# Patient Record
Sex: MALE | Race: WHITE | Employment: OTHER | URBAN - METROPOLITAN AREA
[De-identification: names, ages, dates, MRNs, and addresses within clinical notes are randomized per-mention and may not be internally consistent; named-entity substitution may affect disease eponyms.]

---

## 2023-12-30 ENCOUNTER — APPOINTMENT (OUTPATIENT)
Dept: CT IMAGING | Age: 73
End: 2023-12-30
Payer: MEDICARE

## 2023-12-30 ENCOUNTER — HOSPITAL ENCOUNTER (EMERGENCY)
Age: 73
Discharge: HOME | End: 2023-12-30
Attending: EMERGENCY MEDICINE
Payer: MEDICARE

## 2023-12-30 VITALS
DIASTOLIC BLOOD PRESSURE: 67 MMHG | OXYGEN SATURATION: 100 % | SYSTOLIC BLOOD PRESSURE: 133 MMHG | WEIGHT: 203.5 LBS | RESPIRATION RATE: 18 BRPM | HEART RATE: 72 BPM | TEMPERATURE: 98.5 F

## 2023-12-30 DIAGNOSIS — S00.93XA CONTUSION OF HEAD, UNSPECIFIED PART OF HEAD, INITIAL ENCOUNTER: Primary | ICD-10-CM

## 2023-12-30 PROCEDURE — 70450 CT HEAD/BRAIN W/O DYE: CPT

## 2023-12-30 PROCEDURE — 99284 EMERGENCY DEPT VISIT MOD MDM: CPT

## 2023-12-30 ASSESSMENT — PAIN - FUNCTIONAL ASSESSMENT: PAIN_FUNCTIONAL_ASSESSMENT: NONE - DENIES PAIN

## 2023-12-30 NOTE — ED TRIAGE NOTES
Pt here from [facility] on [address], was trying to transfer from bed to [facility] and fell. Hit head, no LOC, + thinners. Denies neck or back pain. VSS.

## 2024-01-09 ASSESSMENT — ENCOUNTER SYMPTOMS
COLOR CHANGE: 0
EYE REDNESS: 0
ABDOMINAL PAIN: 0
EYE PAIN: 0
STRIDOR: 0
BACK PAIN: 0
NAUSEA: 0
VOMITING: 0
SHORTNESS OF BREATH: 0
FACIAL SWELLING: 0

## 2024-01-09 NOTE — ED PROVIDER NOTES
Emergency Department Provider Note       PCP: Avelino Oconnell Jr., MD   Age: 73 y.o.   Sex: male     DISPOSITION Decision To Discharge 12/30/2023 06:41:45 PM       ICD-10-CM    1. Contusion of head, unspecified part of head, initial encounter  S00.93XA           Medical Decision Making     Complexity of Problems Addressed:  1 minor injury    Data Reviewed and Analyzed:  I independently ordered and reviewed each unique test.    I interpreted the CT Scan CT scan of the head negative for acute fracture or hemorrhage.    Discussion of management or test interpretation.  Elderly gentleman with fall at nursing home concern for head injury, CT head and exam are benign.  Patient is cleared to return    Risk of Complications and/or Morbidity of Patient Management:  Patient was discharged risks and benefits of hospitalization were considered.  Shared medical decision making was utilized in creating the patients health plan today.         History       Chief complaint : Fall with head strike    HISTORY OF PRESENT ILLNESS :  Location : Occipital impact, occurred at the nursing home    Quality : Asymptomatic    Quantity : Constant    Timing : Just prior to arrival    Severity : Minimal    Context : Was attempting to get into a wheelchair when he fell    Alleviating / exacerbating factors : Patient is on blood thinners    Associated Symptoms : No loss of consciousness, nausea, vomiting           Review of Systems   HENT:  Negative for facial swelling and nosebleeds.    Eyes:  Negative for pain and redness.   Respiratory:  Negative for shortness of breath and stridor.    Cardiovascular:  Negative for chest pain and palpitations.   Gastrointestinal:  Negative for abdominal pain, nausea and vomiting.   Genitourinary:  Negative for flank pain and hematuria.   Musculoskeletal:  Negative for arthralgias, back pain and neck pain.   Skin:  Negative for color change and wound.   Neurological:  Negative for syncope and headaches.